# Patient Record
Sex: MALE | Race: WHITE | HISPANIC OR LATINO | Employment: FULL TIME | URBAN - METROPOLITAN AREA
[De-identification: names, ages, dates, MRNs, and addresses within clinical notes are randomized per-mention and may not be internally consistent; named-entity substitution may affect disease eponyms.]

---

## 2021-07-13 ENCOUNTER — HOSPITAL ENCOUNTER (EMERGENCY)
Facility: MEDICAL CENTER | Age: 41
End: 2021-07-13
Attending: EMERGENCY MEDICINE

## 2021-07-13 VITALS
WEIGHT: 200 LBS | RESPIRATION RATE: 16 BRPM | HEIGHT: 71 IN | BODY MASS INDEX: 28 KG/M2 | SYSTOLIC BLOOD PRESSURE: 141 MMHG | DIASTOLIC BLOOD PRESSURE: 79 MMHG | HEART RATE: 93 BPM | TEMPERATURE: 98.5 F | OXYGEN SATURATION: 94 %

## 2021-07-13 DIAGNOSIS — R04.0 EPISTAXIS: ICD-10-CM

## 2021-07-13 PROCEDURE — 700111 HCHG RX REV CODE 636 W/ 250 OVERRIDE (IP): Performed by: EMERGENCY MEDICINE

## 2021-07-13 PROCEDURE — 99284 EMERGENCY DEPT VISIT MOD MDM: CPT

## 2021-07-13 PROCEDURE — A9270 NON-COVERED ITEM OR SERVICE: HCPCS | Performed by: EMERGENCY MEDICINE

## 2021-07-13 PROCEDURE — 700102 HCHG RX REV CODE 250 W/ 637 OVERRIDE(OP): Performed by: EMERGENCY MEDICINE

## 2021-07-13 RX ORDER — ONDANSETRON 4 MG/1
4 TABLET, ORALLY DISINTEGRATING ORAL ONCE
Status: COMPLETED | OUTPATIENT
Start: 2021-07-13 | End: 2021-07-13

## 2021-07-13 RX ORDER — TRANEXAMIC ACID 100 MG/ML
3 INJECTION, SOLUTION INTRAVENOUS ONCE
Status: DISCONTINUED | OUTPATIENT
Start: 2021-07-13 | End: 2021-07-13

## 2021-07-13 RX ORDER — OXYMETAZOLINE HYDROCHLORIDE 0.05 G/100ML
2 SPRAY NASAL ONCE
Status: COMPLETED | OUTPATIENT
Start: 2021-07-13 | End: 2021-07-13

## 2021-07-13 RX ADMIN — OXYMETAZOLINE HCL 2 SPRAY: 0.05 SPRAY NASAL at 10:02

## 2021-07-13 RX ADMIN — ONDANSETRON 4 MG: 4 TABLET, ORALLY DISINTEGRATING ORAL at 10:50

## 2021-07-13 NOTE — ED PROVIDER NOTES
ED Provider Note    Scribed for Brianna Ag M.D. by Josue Cortez-Reyes. 7/13/2021  10:01 AM    Means of arrival: EMS  History obtained from: Patient  History limited by: None      CHIEF COMPLAINT  Chief Complaint   Patient presents with   • Epistaxis     pt states he was bending over at work around 0830. clamp present, bleeding still happening. pt feels nauseated. pt denies dizziness       HPI  Fish Garcia is a 40 y.o. male who presents to the Emergency Department for evaluation of epistaxis onset at around 8:30 AM. The patient states that he was bending over at work when his nose suddenly started bleeding. The patient denies any digital manipulation to his nare which may have triggered his epistaxis. He notes that he usually has one or two nose bleeds a year and that they normally resolve within two minutes. However, he states that he presents to the ED today as he has been unable to control the bleeding. The patient endorses additional nausea secondary to swallowing blood. The patient states that he has a history of hypertension and high cholesterol; adding that he is on medication for both. The patient also notes that he is pre-diabetic. He denies any history of clotting disorders. He also notes that he is not a chronic alcoholic and denies any liver failure.     REVIEW OF SYSTEMS  Pertinent positive include Epistaxis, and nausea. Pertinent negative include no other pain or illness at this time.    PAST MEDICAL HISTORY  No pertinent past medical history noted.     SOCIAL HISTORY  Social History     Tobacco Use   • Smoking status: Not noted   Substance and Sexual Activity   • Alcohol use: Not noted   • Drug use: Not noted   • Sexual activity: Not noted       SURGICAL HISTORY  patient denies any surgical history    CURRENT MEDICATIONS  No current outpatient medications    ALLERGIES  No Known Allergies    PHYSICAL EXAM   VITAL SIGNS: /86   Pulse 100   Temp 35.9 °C (96.7 °F) (Temporal)   Resp  "18   Ht 1.803 m (5' 11\")   Wt 90.7 kg (200 lb)   SpO2 97%   BMI 27.89 kg/m²    Constitutional: Non toxic well appearing male, Nose clamp in place, Alert in no apparent distress.  HENT: Normocephalic, Atraumatic. Bilateral external ears normal. Bleeding from right nare, unable to visualize sight of bleed, Small amount of blood in posterior oraphynx.  Moist mucous membranes.  Oropharynx clear.  Eyes: Pupils are equal and reactive. Conjunctiva normal.   Neck: Supple, full range of motion  Musculoskeletal: Atraumatic. No obvious deformities noted.  No lower extremity edema.  Skin: Warm, Dry.  No erythema, No rash.   Neurologic: Alert and oriented x3. Moving all extremities spontaneously without focal deficits.  Psychiatric: Affect normal, Mood normal, Appears appropriate and not intoxicated.      ED COURSE  Vitals:    07/13/21 0946 07/13/21 0948   BP:  134/86   Pulse:  100   Resp:  18   Temp:  35.9 °C (96.7 °F)   TempSrc:  Temporal   SpO2:  97%   Weight: 90.7 kg (200 lb)    Height: 1.803 m (5' 11\")          Medications administered:  Medications   oxymetazoline (AFRIN) 0.05 % nasal spray 2 Spray (has no administration in time range)       10:01 AM Patient seen and examined at bedside. The patient presents with epistaxis. Patient will be treated with Zofran, and Afrin for his symptoms. I informed the patient of my plan to treat him with the above medication. I also discussed the possibility of having to pack his nose if his nose bleed does not resolve with the above medication and pressure. Patient verbalizes understanding and agreement to this plan of care.     11:33 AM - I reevaluated the patient at bedside. The patient's nose bleed remained resolved without applied pressure from a nose clip. I offered the patient to pack his nose; however, he states that he would like to hold of on having his nose packed for now.       MEDICAL DECISION MAKING  Otherwise healthy male presents with epistaxis which started this " morning.  He is well-appearing with normal vital signs on arrival.  He did have ongoing bleeding on arrival from the right nare.  Vital signs are all normal.  There is no history of trauma or infection.  Bleeding was able to be controlled with Afrin and nasal clamp.  I did offer the patient nasal packing to ensure that it does not restart upon discharge however he would like to hold off at this time.  Will discharge home with instructions on continued Afrin use and pressure for recurrent bleeding.  Patient understands plan of care and strict return precautions for changing or worsening symptoms.          The patient will return for new or worsening symptoms and is stable at the time of discharge.    DISPOSITION:  Patient will be discharged home in stable condition.    FOLLOW UP:  [address] [address] [phone]  Call   to establish primary care physician    [facility], Emergency Dept  [address] [facility] [address] [phone]    If symptoms worsen      OUTPATIENT MEDICATIONS:  New Prescriptions    No medications on file         IMPRESSION  (R04.0) Epistaxis    Results, diagnoses, and treatment options were discussed with the patient and/or family. Patient verbalized understanding of plan of care.    New Prescriptions    No medications on file            I, Josue Cortez-Reyes (Scribe), am scribing for, and in the presence of, Brianna Ag M.D..    Electronically signed by: Josue Cortez-Reyes (Scribe), 7/13/2021    Brianna LECHUGA M.D. personally performed the services described in this documentation, as scribed by Josue Cortez-Reyes in my presence, and it is both accurate and complete. E.    The note accurately reflects work and decisions made by me.  Brianna Ag M.D.  7/13/2021  1:53 PM

## 2021-07-13 NOTE — ED TRIAGE NOTES
Chief Complaint   Patient presents with   • Epistaxis     pt states he was bending over at work around 0830. clamp present, bleeding still happening. pt feels nauseated. pt denies dizziness       Pt BIB EMS for above. Pt aox4, GCS 15. Pt does have hx of HTN and states he takes his BP meds inconsistently. Pt states he gets nose bleeds 1-2 times a year